# Patient Record
(demographics unavailable — no encounter records)

---

## 2025-03-31 NOTE — PHYSICAL EXAM
[FreeTextEntry1] : NEURO: Mental Status Oriented to person, place, time, and situation Speech is clear, fluent, and spontaneous. Comprehension intact  Cranial Nerves Visual fields full to confrontation Pupils equal, round, reactive to light. Extraocular movements intact. No nystagmus or ptosis. Facial sensation intact and symmetric in V1, V2, V3 Facial movement intact and symmetric Uvula midline, soft palate elevates normally Bilateral shoulder shrug intact Tongue midline, no tongue bite sign or deviation on protrusion  Trapezius spasm b/l. Neck ROM intact, elicits pain looking up and left.   Motor Tone and bulk intact Shoulder abduction: 5/5 b/l Elbow flexion/extension: 5/5 bl Hand : 5/5 b/l Hip flexion/extension: 5/5 b/l Knee flexion/extension: 5/5 b/l No pronator drift   Sensation Light touch grossly intact   Deep Tendon Reflexes Biceps 1+ b/l    Coordination Normal finger to nose bilaterally No past pointing   Gait Normal stance, stride, and pivot turn Negative Romberg.     GEN: awake, alert, interactive, no acute distress EYES: sclera white, conjunctiva clear, no redness or discharge ENT: normal appearing outer ears, hearing grossly intact EXT: moving all extremities spontaneously, no edema, no cyanosis SKIN: warm, dry

## 2025-03-31 NOTE — ASSESSMENT
[FreeTextEntry1] : 38 year old male with PMHx herniated discs in c- and t- spine presenting today for neck pain. Neck pain onset 1 month ago, initially intermittent after lifting windows/physical activity, now constant without pain free time. Some relief with OTC naproxen. Exacerbated with ROM up and to left.  Physical exam with 1+ BUE reflexes but intact sensation, strength. Neg Romberg.  MRI BRAIN 12/29/2020- no hemorrhage, stroke, extra axial collection. Focused evaluation of the internal auditory canals demonstrates no cerebellopontine angle mass on either side. There is no abnormal enhancement on either side to suggest vestibular schwannoma MRI CERVICAL SPINE 1/05/2018 - multilevel small disc herniations without central canal stenosis or foraminal narrowing.  MRI THORACIC SPINE 1/05/2018 - small disc herniations T4-5, T5-6 without central canal stenosis.   Recommendations: - MRI cervical spine to evaluate progression of disc disease and eval for any new pathology - physical therapy referral for cervicalgia - continue OTC Naproxen PRN with food for neck pain - consider referrals to neurosurgery, pain management once MRI results - lifestyle modifications - heating pad, gentle stretching and activity, light massage, avoid strenuous or heavy lifting  Patient to return to office in 3-4 weeks to review results, or sooner if needed. Patient understands to seek urgent medical evaluation for any new or worsening symptoms.

## 2025-03-31 NOTE — HISTORY OF PRESENT ILLNESS
[FreeTextEntry1] : Elias Cleary is a 38 year old male presenting today for neck pain.  He endorses neck pain onset a month ago.  Around this time, he was picking up windows and putting them around his house.  It started off as an intermittent soreness in the middle of the neck, and now is more constant.  There is no pain free time.  He describes as tension in the back of his head.  Sometimes it is associated with numbness and tingling in his bilateral fingertips and it will be hard to hold onto things.  He is a retired , he had MRI at [facility] in the past which showed herniated disks in cervical and thoracic spine.  He has tried OTC naproxen which is helpful, but he does not want to rely on medication.  Yesterday he went to urgent care because of 1 week history of nausea and bilateral ear pressure.  They diagnosed him with fluid in his ears and also sinusitis.  He saw ENT at today, and is currently on Flonase and doxycycline.  He occasionally feels off balance he walks.  Looking up into the left can also elicit neck pain and off-balance sensation.  He did go to physical therapy in the past, which was somewhat helpful, but nothing recently.  He denies any saddle anesthesia or incontinence of bowel or bladder.  MRI BRAIN 12/29/2020- no hemorrhage, stroke, extra axial collection. Focused evaluation of the internal auditory canals demonstrates no cerebellopontine angle mass on either side. There is no abnormal enhancement on either side to suggest vestibular schwannoma MRI CERVICAL SPINE 1/05/2018 - multilevel small disc herniations without central canal stenosis or foraminal narrowing.  MRI THORACIC SPINE 1/05/2018 - small disc herniations T4-5, T5-6 without central canal stenosis.

## 2025-04-15 NOTE — PHYSICAL EXAM
[FreeTextEntry1] : limited exam via telephone visit  speech clear, fluent, spontaneous, comprehension intact, responds appropriately.

## 2025-04-15 NOTE — ASSESSMENT
[FreeTextEntry1] : 38 year old male with PMHx herniated discs in c- and t- spine presenting today via telephone for f/u. Neck pain onset 2/2025, initially intermittent after lifting windows/physical activity, now constant without pain free time. Some relief with OTC NSAIDs. Exacerbated with lifting, neck ROM. No saddle anesthesia, incontinence.   MRI CSPINE 4/14/25 - Broad central C5-6 disc herniation impinging on the anterior aspect cervical cord. Central C4-5 disc herniation indenting the anterior aspect thecal sac. Central C6-7 disc herniation indenting the anterior aspect thecal sac. Central C3-4 disc protrusion without terrie extrusion or central spinal stenosis. MRI BRAIN 12/29/2020- no hemorrhage, stroke, extra axial collection. Focused evaluation of the internal auditory canals demonstrates no cerebellopontine angle mass on either side. There is no abnormal enhancement on either side to suggest vestibular schwannoma MRI CERVICAL SPINE 1/05/2018 - multilevel small disc herniations without central canal stenosis or foraminal narrowing. MRI THORACIC SPINE 1/05/2018 - small disc herniations T4-5, T5-6 without central canal stenosis.  Discussed that MR Cspine shows progression of degenerative changes, namely disc herniations C5-6 now impinging anterior aspect of cervical cord. Strongly advised he be evaluated by back specialist.   Recommendations: - pain management referral - Dr. Ch - neurosurgery referral - Dr. Desai - continue OTC NSAIDs PRN with food for neck pain - lifestyle modifications - heating pad, gentle stretching and activity, light massage, avoid strenuous or heavy lifting - red flag sx of cord compression discussed - if any new sx then he should seek emergent evaluation  Patient to return to office in 3 months or sooner if needed. Patient understands to seek urgent medical evaluation for any new or worsening symptoms.

## 2025-04-15 NOTE — HISTORY OF PRESENT ILLNESS
[FreeTextEntry1] : This visit was provided via telehealth using real-time 2-way audio technology. The patient, ELIAS SOTO , was located in [place] at the time of the visit. The provider, Rosalia MCCURDY, was located at the medical office located in [address] [place] [address] at the time of the visit. The patient, ELIAS SOTO and Provider participated in the telephone encounter. Verbal consent for telephone services was given on 04/15/2025 by the patient, ELIAS SOTO .  Elias Soto is a 38 year old male presenting today for f/u.  MRI CSPINE 4/14/25 - Broad central C5-6 disc herniation impinging on the anterior aspect cervical cord. Central C4-5 disc herniation indenting the anterior aspect thecal sac. Central C6-7 disc herniation indenting the anterior aspect thecal sac. Central C3-4 disc protrusion without terrie extrusion or central spinal stenosis.  He remains with intermittent neck pain since last visit, occurs when he is lifting or flexing his neck. He planted a bush recently which flared up the pain. Numbness and tingling in b/l fingertips are still intermittent. He takes OTC Motrin 400 mg which is helpful. His sinus issues have resolved. He denies any lower back pain, BLE weakness or paresthesia, saddle anesthesia, incontinence of bowel or bladder, or other new neurological symptoms.  _____________________  initial visit 3/31/25: He endorses neck pain onset a month ago. Around this time, he was picking up windows and putting them around his house. It started off as an intermittent soreness in the middle of the neck, and now is more constant. There is no pain free time. He describes as tension in the back of his head. Sometimes it is associated with numbness and tingling in his bilateral fingertips and it will be hard to hold onto things. He is a retired , he had MRI at [facility] in the past which showed herniated disks in cervical and thoracic spine. He has tried OTC naproxen which is helpful, but he does not want to rely on medication. Yesterday he went to urgent care because of 1 week history of nausea and bilateral ear pressure. They diagnosed him with fluid in his ears and also sinusitis. He saw ENT at today, and is currently on Flonase and doxycycline. He occasionally feels off balance he walks. Looking up into the left can also elicit neck pain and off-balance sensation. He did go to physical therapy in the past, which was somewhat helpful, but nothing recently. He denies any saddle anesthesia or incontinence of bowel or bladder. MRI BRAIN 12/29/2020- no hemorrhage, stroke, extra axial collection. Focused evaluation of the internal auditory canals demonstrates no cerebellopontine angle mass on either side. There is no abnormal enhancement on either side to suggest vestibular schwannoma MRI CERVICAL SPINE 1/05/2018 - multilevel small disc herniations without central canal stenosis or foraminal narrowing. MRI THORACIC SPINE 1/05/2018 - small disc herniations T4-5, T5-6 without central canal stenosis.